# Patient Record
Sex: MALE | Race: WHITE | Employment: OTHER | URBAN - METROPOLITAN AREA
[De-identification: names, ages, dates, MRNs, and addresses within clinical notes are randomized per-mention and may not be internally consistent; named-entity substitution may affect disease eponyms.]

---

## 2017-01-19 RX ORDER — HYDROCODONE BITARTRATE AND ACETAMINOPHEN 10; 325 MG/1; MG/1
TABLET ORAL
Qty: 90 TABLET | Refills: 0 | Status: SHIPPED | OUTPATIENT
Start: 2017-01-19 | End: 2017-02-27 | Stop reason: SDUPTHER

## 2017-01-25 RX ORDER — INDOMETHACIN 25 MG/1
25 CAPSULE ORAL
Qty: 60 CAPSULE | Refills: 3 | Status: SHIPPED | OUTPATIENT
Start: 2017-01-25 | End: 2017-03-06

## 2017-02-20 RX ORDER — ENALAPRIL MALEATE 5 MG/1
TABLET ORAL
Qty: 90 TABLET | Refills: 2 | Status: SHIPPED | OUTPATIENT
Start: 2017-02-20

## 2017-02-27 RX ORDER — HYDROCODONE BITARTRATE AND ACETAMINOPHEN 10; 325 MG/1; MG/1
TABLET ORAL
Qty: 90 TABLET | Refills: 0 | Status: SHIPPED | OUTPATIENT
Start: 2017-02-27 | End: 2017-03-31 | Stop reason: SDUPTHER

## 2017-03-06 ENCOUNTER — TELEPHONE (OUTPATIENT)
Dept: DERMATOLOGY | Age: 47
End: 2017-03-06

## 2017-03-06 ENCOUNTER — TELEPHONE (OUTPATIENT)
Dept: FAMILY MEDICINE CLINIC | Age: 47
End: 2017-03-06

## 2017-03-06 ENCOUNTER — OFFICE VISIT (OUTPATIENT)
Dept: DERMATOLOGY | Age: 47
End: 2017-03-06

## 2017-03-06 VITALS — SYSTOLIC BLOOD PRESSURE: 152 MMHG | DIASTOLIC BLOOD PRESSURE: 110 MMHG

## 2017-03-06 DIAGNOSIS — B02.9 HERPES ZOSTER INFECTION OF THORACIC REGION: Primary | ICD-10-CM

## 2017-03-06 PROCEDURE — 99214 OFFICE O/P EST MOD 30 MIN: CPT | Performed by: DERMATOLOGY

## 2017-03-07 ENCOUNTER — TELEPHONE (OUTPATIENT)
Dept: FAMILY MEDICINE CLINIC | Age: 47
End: 2017-03-07

## 2017-03-07 RX ORDER — GABAPENTIN 600 MG/1
300 TABLET ORAL 2 TIMES DAILY
Qty: 60 TABLET | Refills: 3 | Status: SHIPPED | OUTPATIENT
Start: 2017-03-07 | End: 2017-08-09 | Stop reason: ALTCHOICE

## 2017-03-31 RX ORDER — HYDROCODONE BITARTRATE AND ACETAMINOPHEN 10; 325 MG/1; MG/1
TABLET ORAL
Qty: 90 TABLET | Refills: 0 | Status: SHIPPED | OUTPATIENT
Start: 2017-03-31

## 2017-08-08 ENCOUNTER — TELEPHONE (OUTPATIENT)
Dept: DERMATOLOGY | Age: 47
End: 2017-08-08

## 2017-08-09 ENCOUNTER — OFFICE VISIT (OUTPATIENT)
Dept: DERMATOLOGY | Age: 47
End: 2017-08-09

## 2017-08-09 DIAGNOSIS — B37.2 CUTANEOUS CANDIDIASIS: Primary | ICD-10-CM

## 2017-08-09 PROCEDURE — 99214 OFFICE O/P EST MOD 30 MIN: CPT | Performed by: DERMATOLOGY

## 2017-08-09 RX ORDER — KETOCONAZOLE 20 MG/G
CREAM TOPICAL
Qty: 60 G | Refills: 1 | Status: SHIPPED | OUTPATIENT
Start: 2017-08-09

## 2017-09-20 ENCOUNTER — OFFICE VISIT (OUTPATIENT)
Dept: DERMATOLOGY | Age: 47
End: 2017-09-20

## 2017-09-20 DIAGNOSIS — L57.0 ACTINIC KERATOSES: ICD-10-CM

## 2017-09-20 DIAGNOSIS — D22.9 MULTIPLE BENIGN NEVI: Primary | ICD-10-CM

## 2017-09-20 DIAGNOSIS — Z12.83 SCREENING EXAM FOR SKIN CANCER: ICD-10-CM

## 2017-09-20 PROCEDURE — 99213 OFFICE O/P EST LOW 20 MIN: CPT | Performed by: DERMATOLOGY

## 2017-09-20 PROCEDURE — 17000 DESTRUCT PREMALG LESION: CPT | Performed by: DERMATOLOGY

## 2017-09-20 PROCEDURE — 17003 DESTRUCT PREMALG LES 2-14: CPT | Performed by: DERMATOLOGY

## 2018-02-02 NOTE — TELEPHONE ENCOUNTER
Pt calling need refill on medication Triamcinolone 0.1% ointment pls call [facility] @ [phone] and states he needs something for his face did not give name of medication

## 2018-02-08 RX ORDER — PIMECROLIMUS 10 MG/G
CREAM TOPICAL
Qty: 60 G | Refills: 0 | Status: SHIPPED | OUTPATIENT
Start: 2018-02-08

## 2018-09-27 ENCOUNTER — OFFICE VISIT (OUTPATIENT)
Dept: DERMATOLOGY | Age: 48
End: 2018-09-27
Payer: COMMERCIAL

## 2018-09-27 DIAGNOSIS — Z12.83 SCREENING EXAM FOR SKIN CANCER: ICD-10-CM

## 2018-09-27 DIAGNOSIS — L57.0 ACTINIC KERATOSES: ICD-10-CM

## 2018-09-27 DIAGNOSIS — D17.1 LIPOMA OF BUTTOCK: ICD-10-CM

## 2018-09-27 DIAGNOSIS — D22.9 MULTIPLE BENIGN NEVI: Primary | ICD-10-CM

## 2018-09-27 DIAGNOSIS — R20.9 DISTURBANCE OF SKIN SENSATION: ICD-10-CM

## 2018-09-27 PROCEDURE — 99213 OFFICE O/P EST LOW 20 MIN: CPT | Performed by: DERMATOLOGY

## 2018-09-27 NOTE — PROGRESS NOTES
[facility]) Dermatology  Bubba Valero      Daniela Rachelle  1970    51 y.o. male     Date of Visit: 9/27/2018    Last Visit: 1yr    Chief Complaint: Skin check    History of Present Illness:  1. Here for skin/mole check. No new moles. No moles changing in size, shape, color. No moles associated w/ pain, bleeding, pruritus.   -Spends a lot of time in sun. Uses SPF 50 sunscreen intermittently. 2. History of actinic keratoses s/p cryotherapy. Reports rough lesions on back of neck. 3. Complains of an increasingly tender lesion on L buttock. Derm History:   -Dermatitis - TAC 0.1% oint, elidel crm  -Cutaneous candidiasis, inguinal folds - ketoconazole crm  -Herpes zoster T7 (2017)    Review of Systems:  Constitutional: Reports general sense of well-being. Skin: No interval severe sunburns. Allergies: Reviewed and updated. Past Medical History, Surgical History, Medications and Allergies reviewed. Past Medical History:   Diagnosis Date    Depression     GERD (gastroesophageal reflux disease)     Gout     Hyperlipidemia     Hypertension      Past Surgical History:   Procedure Laterality Date    KNEE SURGERY  7/2009    fx tibial plateau, meniscal tear dr Mervin Del Rosario       No Known Allergies  Outpatient Prescriptions Marked as Taking for the 9/27/18 encounter (Office Visit) with Shara Hernandez MD   Medication Sig Dispense Refill    Esomeprazole Magnesium (NEXIUM PO) Take 40 mg by mouth      pimecrolimus (ELIDEL) 1 % cream Apply sparingly to affected area(s) up to bid prn. 60 g 0    ketoconazole (NIZORAL) 2 % cream Apply twice daily on affected areas 2-4 weeks or until clear. 60 g 1    enalapril (VASOTEC) 5 MG tablet TAKE ONE TABLET BY MOUTH DAILY 90 tablet 2    valACYclovir (VALTREX) 1 G tablet Take 1 tablet by mouth 3 times daily 60 tablet 3    allopurinol (ZYLOPRIM) 300 MG tablet TAKE ONE TABLET BY MOUTH DAILY 90 tablet 3     Social History: New construction.     Physical Examination     The following were examined and determined to be normal: Psych/Neuro, Scalp/hair, Conjunctivae/eyelids, Gums/teeth/lips and Nails/digits. The following were examined and determined to be abnormal: Head/face, Neck, Breast/axilla/chest, Abdomen, Back, RUE, LUE, RLE, LLE and Genitalia/groin/buttocks. -General: Well-appearing, NAD  1. Scattered on the trunk and extremities are multiple well-defined round and oval symmetric smoothly-bordered uniformly brown macules and papules. 2. Posterior neck, helices, temples, nasal bridge, extensor forearms several - ill-defined irregularly-shaped roughly-scaling thin pink macules/papules   3. L upper lateral buttock - 3.5cm doughy skin-colored nodule     Assessment and Plan     1. Benign acquired melanocytic nevi  -Recommend monthly self skin exams   -Educated regarding the ABCDEs of melanoma detection   -Call for any new/changing moles or concerning lesions  -Reviewed sun protective behavior -- sun avoidance during the peak hours of the day, sun-protective clothing (including hat and sunglasses), sunscreen use (water resistant, broad spectrum, SPF at least 30, need for reapplication every 2 to 3 hours), avoidance of tanning beds   -Return for full skin exam in 1 year (sooner if indicated)     2. Actinic keratosis(es)  -Edu re: relationship with chronic cumulative sun exposure, low premalignant potential.   -Pt requested postponing cryotherapy treatment until after this weekend     3.  Symptomatic lipoma, L buttock  -Schedule excision

## 2018-10-15 ENCOUNTER — PROCEDURE VISIT (OUTPATIENT)
Dept: DERMATOLOGY | Age: 48
End: 2018-10-15
Payer: COMMERCIAL

## 2018-10-15 DIAGNOSIS — L57.0 ACTINIC KERATOSES: Primary | ICD-10-CM

## 2018-10-15 PROCEDURE — 17000 DESTRUCT PREMALG LESION: CPT | Performed by: DERMATOLOGY

## 2018-10-15 PROCEDURE — 17003 DESTRUCT PREMALG LES 2-14: CPT | Performed by: DERMATOLOGY

## 2018-12-13 ENCOUNTER — PROCEDURE VISIT (OUTPATIENT)
Dept: DERMATOLOGY | Age: 48
End: 2018-12-13
Payer: COMMERCIAL

## 2018-12-13 VITALS
BODY MASS INDEX: 29.07 KG/M2 | HEART RATE: 95 BPM | SYSTOLIC BLOOD PRESSURE: 146 MMHG | DIASTOLIC BLOOD PRESSURE: 91 MMHG | WEIGHT: 188.4 LBS

## 2018-12-13 DIAGNOSIS — R20.9 DISTURBANCE OF SKIN SENSATION: ICD-10-CM

## 2018-12-13 DIAGNOSIS — L72.0 EPIDERMOID CYST: Primary | ICD-10-CM

## 2018-12-13 PROCEDURE — 12032 INTMD RPR S/A/T/EXT 2.6-7.5: CPT | Performed by: DERMATOLOGY

## 2018-12-13 PROCEDURE — 11404 EXC TR-EXT B9+MARG 3.1-4 CM: CPT | Performed by: DERMATOLOGY

## 2018-12-13 RX ORDER — SULFAMETHOXAZOLE AND TRIMETHOPRIM 800; 160 MG/1; MG/1
1 TABLET ORAL 2 TIMES DAILY
Qty: 14 TABLET | Refills: 0 | Status: SHIPPED | OUTPATIENT
Start: 2018-12-13 | End: 2018-12-20

## 2018-12-13 NOTE — PROGRESS NOTES
Comment: pre excision  Pulse 95   Wt 188 lb 6.4 oz (85.5 kg)   BMI 29.07 kg/m²     -General: Well-appearing, NAD  1. L upper lateral buttock - 4cm doughy skin-colored nodule     Assessment and Plan     1. Symptomatic epidermoid cyst, L upper lateral buttock  -Informed written consent obtained after risks (bleeding, infection, scar, discomfort),  benefits explained. -Area prepped/draped in sterile fashion. Local anesthesia achieved with 1% lidocaine w/ epinephrine/sodium bicarbonate. Incision performed overlying the lesion. Cyst was visualized and dissected from surrounding tissue. Specimen submitted to pathology. Hemostasis achieved w/ electrodessication. Layered closure with 3-0 deep vicryl sutures and 4-0 running nylon sutures.   -Length of closure - 4cm  -Edu re: wound care, suture removal   -Post-procedure /91. Pt left office in stable condition.

## 2018-12-27 ENCOUNTER — NURSE ONLY (OUTPATIENT)
Dept: DERMATOLOGY | Age: 48
End: 2018-12-27

## 2018-12-27 DIAGNOSIS — Z48.02 VISIT FOR SUTURE REMOVAL: ICD-10-CM

## 2018-12-27 PROCEDURE — 99024 POSTOP FOLLOW-UP VISIT: CPT | Performed by: DERMATOLOGY

## 2020-03-20 ENCOUNTER — TELEPHONE (OUTPATIENT)
Dept: DERMATOLOGY | Age: 50
End: 2020-03-20

## 2020-03-20 NOTE — TELEPHONE ENCOUNTER
Left messages on home and cell #'s for the need to cancel his 3-33-20 appt due to the COVID-19 virus.  does have a question regarding the reason for visit, it states possible psoriasis so we wanted to see if there were any acute problems and are we also doing a skin exam at this visit? Asked patient to please call office back.

## 2020-06-22 ENCOUNTER — TELEPHONE (OUTPATIENT)
Dept: DERMATOLOGY | Age: 50
End: 2020-06-22

## 2020-06-22 NOTE — TELEPHONE ENCOUNTER
LM for patient to return call to reschedule FSE and med refill for eczema  Offered appts in July at [facility] office

## 2020-07-13 ENCOUNTER — OFFICE VISIT (OUTPATIENT)
Dept: DERMATOLOGY | Age: 50
End: 2020-07-13
Payer: COMMERCIAL

## 2020-07-13 VITALS — TEMPERATURE: 97.7 F

## 2020-07-13 PROCEDURE — 17000 DESTRUCT PREMALG LESION: CPT | Performed by: DERMATOLOGY

## 2020-07-13 PROCEDURE — 99213 OFFICE O/P EST LOW 20 MIN: CPT | Performed by: DERMATOLOGY

## 2020-07-13 PROCEDURE — 17003 DESTRUCT PREMALG LES 2-14: CPT | Performed by: DERMATOLOGY
